# Patient Record
Sex: FEMALE | Race: AMERICAN INDIAN OR ALASKA NATIVE
[De-identification: names, ages, dates, MRNs, and addresses within clinical notes are randomized per-mention and may not be internally consistent; named-entity substitution may affect disease eponyms.]

---

## 2018-02-19 NOTE — MAMMOGRAPHY REPORT
Bilateral mammogram: Compared to 10/10/16. CAD study utilized.



Findings:



Scattered glandular parenchyma bilaterally. No microcalcification. 

Benign density right and left breast without significant interval 

change. 

New focal 4 mm dense asymmetry upper mid right breast and inner 

posterior left breast. Normal axilla.



Impression:



New focal asymmetry right and left breast. Recommend spot mag and and 

if necessary sonographic examination. BI-RADS CATEGORY:  0 = Needs 

additional imaging evaluation



ACR BI-RADS MAMMOGRAPHIC CODES:

0 = Needs additional imaging evaluation; 1 = Negative; 2 = Benign; 3 = 

Probably benign; 4 = Suspicious; 5 = Malignant; 6 = Known biopsy-proven 

malignancy



COMMENT:

      1.   Dense breast tissue, i.e., adenosis, fibrocystic    

            changes, etc., may obscure an underlying neoplasm.

      2.   Approximately 10% of cancers are not detected with

            mammography.

      3.   A negative mammography report should not delay biopsy 

            if a clinically suspicious mass is present. COMMENT:

Patient follow-up letters are generated in [employer].

## 2020-09-01 ENCOUNTER — HOSPITAL ENCOUNTER (OUTPATIENT)
Dept: HOSPITAL 5 - SPVWC | Age: 55
Discharge: HOME | End: 2020-09-01
Attending: FAMILY MEDICINE
Payer: COMMERCIAL

## 2020-09-01 DIAGNOSIS — R92.8: Primary | ICD-10-CM

## 2020-09-01 PROCEDURE — 77066 DX MAMMO INCL CAD BI: CPT

## 2020-09-01 NOTE — MAMMOGRAPHY REPORT
DIGITAL SCREENING MAMMOGRAM WITH CAD, 9/1/2020



INDICATION: Routine screening mammography. ABNORMAL MAMMOGRAM



TECHNIQUE:  Digital bilateral  2D mammography was obtained in the craniocaudal and mediolateral obliq
ue projections. This examination was interpreted with the benefit of Computer-Aided Detection analysi
s.



COMPARISON: 8/30/2019 and priors



FINDINGS: 



Breast Density: There are scattered areas of fibroglandular density.



There is no evidence of dominant mass, suspicious calcifications or architectural distortion in eithe
r breast. Right biopsy changes are again seen. Mild right nodularity is stable. Mild bilateral ductal
 prominence of the retroareolar areas is stable.



IMPRESSION: No evidence of malignancy



Follow up recommendation: Routine yearly



BI-RADS Category 2:  Benign.



A "normal" or negative report should not discourage follow up or biopsy of a clinically significant f
inding.



A written summary of these findings will be mailed to the patient. The patient will be entered into a
 mammography reporting system which will generate a reminder letter for the patient's next appointmen
t at the appropriate interval.



The American College of Radiology recommends yearly mammograms starting at age 40 and continuing as l
renetta as a woman is in good health.  Breast MRI is recommended for women with an approximate 20-25% or 
greater lifetime risk of breast cancer, including women with a strong family history of breast or ova
susie cancer or who have been treated for Hodgkin's disease.



Signer Name: Sameer Avery MD 

Signed: 9/1/2020 11:49 AM

Workstation Name: [id number]

## 2021-02-15 ENCOUNTER — HOSPITAL ENCOUNTER (OUTPATIENT)
Dept: HOSPITAL 5 - SPVWC | Age: 56
Discharge: HOME | End: 2021-02-15
Attending: FAMILY MEDICINE
Payer: COMMERCIAL

## 2021-02-15 DIAGNOSIS — Z78.0: ICD-10-CM

## 2021-02-15 DIAGNOSIS — M81.0: Primary | ICD-10-CM

## 2021-02-15 PROCEDURE — 77080 DXA BONE DENSITY AXIAL: CPT

## 2021-02-15 NOTE — MAMMOGRAPHY REPORT
DEXA BONE DENSITY SCAN



INDICATION: 

MENOPAUSE.



COMPARISON: 

DEXA scan from 10/10/2016



LUMBAR SPINE (L1-L4):

Bone mineral density (BMD) is 0.833 g/cm2.

T-score is -2.9 (standard deviations of Young Adult mean).

Z-score is -1.7 (standard deviations of Age Matched mean).



There has been a 5.8% decrease in bone mineral density in this region since 2016.





LEFT FEMORAL NECK:

Bone mineral density (BMD) is 0.607 g/cm2.

T-score is -2.4 (standard deviations of Young Adult mean).

Z-score is -1.7 (standard deviations of Age Matched mean).



There has been a 9% decrease in total femoral bone mineral density since 2016.



IMPRESSION:

1. WHO Classification: Osteoporosis. Fracture Risk: High. 



Signer Name: Cristobal Serrano MD 

Signed: 2/15/2021 2:39 PM

Workstation Name: [email or web address]

## 2021-08-18 ENCOUNTER — HOSPITAL ENCOUNTER (OUTPATIENT)
Dept: HOSPITAL 5 - MAMMO | Age: 56
Discharge: HOME | End: 2021-08-18
Attending: FAMILY MEDICINE
Payer: COMMERCIAL

## 2021-08-18 DIAGNOSIS — N64.89: ICD-10-CM

## 2021-08-18 DIAGNOSIS — Z12.31: Primary | ICD-10-CM

## 2021-08-18 PROCEDURE — 77067 SCR MAMMO BI INCL CAD: CPT

## 2021-08-18 NOTE — MAMMOGRAPHY REPORT
DIGITAL SCREENING MAMMOGRAM WITH CAD, 8/18/2021



CLINICAL INFORMATION / INDICATION: Routine screening mammography. SCREENING MAMMOGRAM



TECHNIQUE:  Digital bilateral 2D mammography was obtained in the craniocaudal and mediolateral obliqu
e projections. This examination was interpreted with the benefit of Computer-Aided Detection analysis
.



COMPARISON: 10/10/2016 through 9/1/2020



FINDINGS: 



Breast Density: The breasts are heterogeneously dense, which may obscure small masses.



No dominant mass, suspicious calcifications, or architectural distortion in either breast. 



Postbiopsy changes in the right upper outer quadrant posteriorly with an associated biopsy clip are a
gain identified. There are minimal benign breast arterial calcifications bilaterally. No new abnormal
ity is seen.

 

IMPRESSION: No mammographic evidence of malignancy.



Follow up recommendation: Routine yearly



BI-RADS Category 2:  Benign.





-------------------------------------------------------------------------------------------

A "normal" or negative report should not discourage follow up or biopsy of a clinically significant f
inding.



A written summary of these findings will be mailed to the patient. The patient will be entered into a
 mammography reporting system which will generate a reminder letter for the patient's next appointmen
t at the appropriate interval.



The American College of Radiology recommends yearly mammograms starting at age 40 and continuing as l
renetta as a woman is in good health.  Breast MRI is recommended for women with an approximate 20-25% or 
greater lifetime risk of breast cancer, including women with a strong family history of breast or ova
susie cancer or who have been treated for Hodgkin's disease.



Signer Name: Lorenzo Garrido MD 

Signed: 8/18/2021 2:24 PM

Workstation Name: [email or web address]

## 2021-09-30 ENCOUNTER — HOSPITAL ENCOUNTER (EMERGENCY)
Dept: HOSPITAL 5 - ED | Age: 56
Discharge: HOME | End: 2021-09-30
Payer: COMMERCIAL

## 2021-09-30 VITALS — SYSTOLIC BLOOD PRESSURE: 138 MMHG | DIASTOLIC BLOOD PRESSURE: 88 MMHG
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
## 2021-09-30 DIAGNOSIS — I10: ICD-10-CM
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
## 2021-09-30 DIAGNOSIS — M54.6: ICD-10-CM
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
## 2021-09-30 DIAGNOSIS — S13.4XXA: Primary | ICD-10-CM

## 2021-09-30 DIAGNOSIS — S39.92XA: ICD-10-CM
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
## 2021-09-30 PROCEDURE — 72100 X-RAY EXAM L-S SPINE 2/3 VWS: CPT

## 2021-09-30 PROCEDURE — 72070 X-RAY EXAM THORAC SPINE 2VWS: CPT

## 2021-09-30 PROCEDURE — 72040 X-RAY EXAM NECK SPINE 2-3 VW: CPT

## 2021-09-30 PROCEDURE — 99283 EMERGENCY DEPT VISIT LOW MDM: CPT
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
## 2022-09-06 ENCOUNTER — HOSPITAL ENCOUNTER (OUTPATIENT)
Dept: HOSPITAL 5 - SPVWC | Age: 57
Discharge: HOME | End: 2022-09-06
Attending: FAMILY MEDICINE
Payer: COMMERCIAL

## 2022-09-06 DIAGNOSIS — Z12.31: Primary | ICD-10-CM

## 2022-09-06 PROCEDURE — 77067 SCR MAMMO BI INCL CAD: CPT

## 2022-09-07 NOTE — MAMMOGRAPHY REPORT
DIGITAL SCREENING MAMMOGRAM WITH CAD, 9/6/2022



CLINICAL INFORMATION / INDICATION: Routine screening mammography.



TECHNIQUE: Digital bilateral 2D mammography was obtained in the craniocaudal and mediolateral oblique
 projections. This examination was interpreted with the benefit of Computer-Aided Detection analysis.




COMPARISON: 8/18/2021



FINDINGS: 



Breast Density: There are scattered areas of fibroglandular density.



No dominant mass, suspicious calcifications, or architectural distortion in either breast. 



Stable postbiopsy clip within the upper outer quadrant of the right breast. No suspicious change sinc
e the prior exam.

 

IMPRESSION: No mammographic evidence of malignancy.



Follow up recommendation: Routine yearly screening mammogram.



BI-RADS Category 2:  BENIGN.





-------------------------------------------------------------------------------------------

A "normal" or negative report should not discourage follow up or biopsy of a clinically significant f
inding.



A written summary of these findings will be mailed to the patient. The patient will be entered into a
 mammography reporting system which will generate a reminder letter for the patient's next appointmen
t at the appropriate interval.



The American College of Radiology recommends yearly mammograms starting at age 40 and continuing as l
renetta as a woman is in good health.  Breast MRI is recommended for women with an approximate 20-25% or 
greater lifetime risk of breast cancer, including women with a strong family history of breast or ova
susie cancer or who have been treated for Hodgkin's disease.



Signer Name: Lorenzo Gage MD 

Signed: 9/7/2022 12:08 PM

Workstation Name: [email or web address]